# Patient Record
Sex: MALE | Race: AMERICAN INDIAN OR ALASKA NATIVE
[De-identification: names, ages, dates, MRNs, and addresses within clinical notes are randomized per-mention and may not be internally consistent; named-entity substitution may affect disease eponyms.]

---

## 2018-06-06 ENCOUNTER — HOSPITAL ENCOUNTER (EMERGENCY)
Dept: HOSPITAL 5 - ED | Age: 28
LOS: 1 days | Discharge: LEFT BEFORE DISCHARGE | End: 2018-06-07
Payer: SELF-PAY

## 2018-06-06 VITALS — SYSTOLIC BLOOD PRESSURE: 134 MMHG | DIASTOLIC BLOOD PRESSURE: 73 MMHG

## 2018-06-06 DIAGNOSIS — J02.9: Primary | ICD-10-CM

## 2018-06-06 DIAGNOSIS — Z53.21: ICD-10-CM

## 2018-10-03 ENCOUNTER — HOSPITAL ENCOUNTER (EMERGENCY)
Dept: HOSPITAL 5 - ED | Age: 28
LOS: 1 days | Discharge: HOME | End: 2018-10-04
Payer: SELF-PAY

## 2018-10-03 VITALS — DIASTOLIC BLOOD PRESSURE: 86 MMHG | SYSTOLIC BLOOD PRESSURE: 125 MMHG

## 2018-10-03 DIAGNOSIS — M54.2: Primary | ICD-10-CM

## 2018-10-03 PROCEDURE — 99283 EMERGENCY DEPT VISIT LOW MDM: CPT

## 2018-10-03 PROCEDURE — 72040 X-RAY EXAM NECK SPINE 2-3 VW: CPT

## 2018-10-03 NOTE — XRAY REPORT
FINAL REPORT



PROCEDURE:  Cervical spine. 



TECHNIQUE:  Three views.



HISTORY:  Neck pain. 



COMPARISON:  No prior studies are available for comparison.



FINDINGS:  

The cervical vertebrae have normal height and satisfactory

alignment. There are no fractures. There is no subluxation. The

disc spaces appear adequate. The prevertebral soft tissues have

normal thickness.



IMPRESSION:  

No significant abnormality.

## 2018-10-04 NOTE — EMERGENCY DEPARTMENT REPORT
ED Neck Pain/Injury HPI





- General


Chief Complaint: Neck Pain/Injury


Stated Complaint: NECK PAIN, LEFT ARM PAIN


Time Seen by Provider: 10/04/18 00:55


Source: patient


Mode of arrival: Ambulatory


Limitations: No Limitations





- History of Present Illness


Initial Comments: 





28-year-old  presents to the emergency department 

complaining of progressively worsening left neck pain that radiates to shoulder 

causes tingling sensation to the hands.  Denies any loss of strength, however, 

pain is progressively worsening and they're waxing and waning fashion.


MD Complaint: neck pain


-: Gradual, week(s) (1)


Place: home (woke with pain and it progressively worsen. )


Radiation: left lateral, left shoulder


Severity: moderate


Quality: burning, sharp


Consistency: constant


Improves With: heat therapy


Worsens With: movement of extremity, movement of neck


Context: unknown (woke with pain from.)


Associated Symptoms: none


Treatments Prior to Arrival: none





- Related Data


 Previous Rx's











 Medication  Instructions  Recorded  Last Taken  Type


 


Ketorolac [Toradol] 10 mg PO Q8H PRN #15 tablet 10/04/18 Unknown Rx


 


Methocarbamol [Robaxin-750] 750 mg PO TID #20 tablet 10/04/18 Unknown Rx











 Allergies











Allergy/AdvReac Type Severity Reaction Status Date / Time


 


No Known Allergies Allergy   Unverified 06/06/18 21:50














ED Review of Systems


ROS: 


Stated complaint: NECK PAIN, LEFT ARM PAIN


Other details as noted in HPI





Constitutional: denies: chills, fever


Eyes: denies: eye pain, eye discharge, vision change


ENT: denies: ear pain, throat pain


Respiratory: denies: cough, shortness of breath, wheezing


Cardiovascular: denies: chest pain, palpitations


Endocrine: no symptoms reported


Gastrointestinal: denies: abdominal pain, nausea, diarrhea


Genitourinary: denies: urgency, dysuria


Musculoskeletal: denies: back pain, joint swelling, arthralgia


Skin: denies: rash, lesions


Neurological: denies: headache, weakness, paresthesias


Psychiatric: denies: anxiety, depression


Hematological/Lymphatic: denies: easy bleeding, easy bruising





ED Past Medical Hx





- Past Medical History


Previous Medical History?: No





- Surgical History


Past Surgical History?: No





- Social History


Smoking Status: Never Smoker


Substance Use Type: None





- Medications


Home Medications: 


 Home Medications











 Medication  Instructions  Recorded  Confirmed  Last Taken  Type


 


Ketorolac [Toradol] 10 mg PO Q8H PRN #15 tablet 10/04/18  Unknown Rx


 


Methocarbamol [Robaxin-750] 750 mg PO TID #20 tablet 10/04/18  Unknown Rx














ED Physical Exam





- General


Limitations: No Limitations


General appearance: alert, in no apparent distress





- Head


Head exam: Present: atraumatic, normocephalic





- Eye


Eye exam: Present: normal appearance, PERRL





- ENT


ENT exam: Present: normal exam, mucous membranes moist





- Neck


Neck exam: Present: normal inspection, tenderness, full ROM (some pain with 

spurlings. mild spasm to left trapezius ).  Absent: meningismus, lymphadenopathy

, thyromegaly





- Respiratory


Respiratory exam: Present: normal lung sounds bilaterally.  Absent: respiratory 

distress





- Cardiovascular


Cardiovascular Exam: Present: regular rate, normal rhythm.  Absent: systolic 

murmur, diastolic murmur, rubs, gallop





- GI/Abdominal


GI/Abdominal exam: Present: soft, normal bowel sounds





- Rectal


Rectal exam: Present: deferred





- Extremities Exam


Extremities exam: Present: normal inspection





- Expanded Upper Extremity Exam


  ** Left


Shoulder Exam: Present: full ROM, other (some pain with obriens and moncada. )


Elbow exam: Present: normal inspection


Forearm Wrist exam: Present: normal inspection, full ROM.  Absent: tenderness, 

swelling, abrasion


Hand Wrist exam: Present: normal inspection, full ROM


Neuro motor exam: Present: thumb opposition intact, thumb IP flexion intact, 

thumb adduction intact


Vascular: Present: normal capillary refill





- Back Exam


Back exam: Present: normal inspection





- Neurological Exam


Neurological exam: Present: alert, oriented X3, CN II-XII intact





- Psychiatric


Psychiatric exam: Present: normal affect, normal mood





- Skin


Skin exam: Present: warm, dry, intact, normal color.  Absent: rash





ED Course





 Vital Signs











  10/03/18 10/03/18





  22:18 22:23


 


Temperature 98.7 F 98.7 F


 


Pulse Rate 73 73


 


Respiratory 16 16





Rate  


 


Blood Pressure  125/86


 


Blood Pressure 125/86 





[Right]  


 


O2 Sat by Pulse 96 96





Oximetry  











Critical care attestation.: 


If time is entered above; I have spent that time in minutes in the direct care 

of this critically ill patient, excluding procedure time.








ED Disposition


Clinical Impression: 


 Neck pain on left side





Disposition: DC-01 TO HOME OR SELFCARE


Is pt being admited?: No


Does the pt Need Aspirin: No


Condition: Stable


Prescriptions: 


Ketorolac [Toradol] 10 mg PO Q8H PRN #15 tablet


 PRN Reason: Pain


Methocarbamol [Robaxin-750] 750 mg PO TID #20 tablet


Referrals: 


PRIMARY CARE,MD [Primary Care Provider] - 3-5 Days


JANE TERRY MD [Staff Physician] - 3-5 Days

## 2019-06-20 ENCOUNTER — HOSPITAL ENCOUNTER (EMERGENCY)
Dept: HOSPITAL 5 - ED | Age: 29
Discharge: HOME | End: 2019-06-20
Payer: SELF-PAY

## 2019-06-20 VITALS — SYSTOLIC BLOOD PRESSURE: 143 MMHG | DIASTOLIC BLOOD PRESSURE: 84 MMHG
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
## 2019-06-20 DIAGNOSIS — M54.2: ICD-10-CM

## 2019-06-20 DIAGNOSIS — M25.512: ICD-10-CM

## 2019-06-20 DIAGNOSIS — M79.18: Primary | ICD-10-CM
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
## 2019-06-20 PROCEDURE — 72040 X-RAY EXAM NECK SPINE 2-3 VW: CPT

## 2019-06-20 PROCEDURE — 96372 THER/PROPH/DIAG INJ SC/IM: CPT

## 2019-06-20 PROCEDURE — 99283 EMERGENCY DEPT VISIT LOW MDM: CPT

## 2019-06-20 PROCEDURE — 73030 X-RAY EXAM OF SHOULDER: CPT

## 2019-06-20 NOTE — XRAY REPORT
CERVICAL SPINE, 3 views:



History: Neck pain.



Findings: The vertebral bodies, disk spaces, posterior elements and 

prevertebral soft tissues are unremarkable. The dens is intact. No 

acute fracture or malalignment is identified.



Mild disc space narrowing is identified at C3-4 and C5-6.



Impression:

Mild cervical spondylosis. No evidence for acute injury to the cervical 

spine.
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
